# Patient Record
Sex: MALE | Race: ASIAN | NOT HISPANIC OR LATINO
[De-identification: names, ages, dates, MRNs, and addresses within clinical notes are randomized per-mention and may not be internally consistent; named-entity substitution may affect disease eponyms.]

---

## 2019-07-18 ENCOUNTER — APPOINTMENT (OUTPATIENT)
Dept: ORTHOPEDIC SURGERY | Facility: CLINIC | Age: 29
End: 2019-07-18
Payer: COMMERCIAL

## 2019-07-18 DIAGNOSIS — M94.269 CHONDROMALACIA, UNSPECIFIED KNEE: ICD-10-CM

## 2019-07-18 PROBLEM — Z00.00 ENCOUNTER FOR PREVENTIVE HEALTH EXAMINATION: Status: ACTIVE | Noted: 2019-07-18

## 2019-07-18 PROCEDURE — 99204 OFFICE O/P NEW MOD 45 MIN: CPT

## 2019-07-22 PROBLEM — M94.269 CHONDROMALACIA, KNEE: Status: ACTIVE | Noted: 2019-07-22

## 2019-07-22 NOTE — HISTORY OF PRESENT ILLNESS
[de-identified] : Patient reports fall 2 years began pain in the knee. Knee was initially swollen, but has gone down. Patient has tried PT as well as taken medication. Patient reports that he previously has surgery on the knee (for chondromalacia?). Patient has MRI of knee that was recently done while he was in [place], and received PRP injection (about one month ago).

## 2019-07-22 NOTE — ASSESSMENT
[FreeTextEntry1] : Discussed at length with patient his prior procedures as well as exam and imaging. Patient given home exercises and recommended for physical therapy and diagnosis of chondromalacia patella patellofemoral pain syndrome. Followup in 6-8 weeks if no improvement

## 2019-07-22 NOTE — PHYSICAL EXAM
[de-identified] : Right knee\par \par Constitutional: \par The patient is healthy-appearing and in no apparent distress. \par \par Gait:\par The patient ambulates with a normal gait and no limp.\par \par Cardiovascular System: \par There is capillary refill less than 2 seconds. \par \par \par Skin: \par There is no skin abnormalities.\par \par Right Knee: \par Bony Palpation: \par There is no tenderness of the medial or lateral joint line, the medial of lateral femoral chondyle, tibial tubercle, superior or inferior patella.\par There is tenderness to the medial and lateral patellar facets.\par \par Soft Tissue Palpation: \par There is no tenderness of the medial and lateral retinaculum, quadriceps tendon, patella tendon, ITB or pes anserine.\par \par Active Range of Motion: \par There is full range of motion at the knee actively and passively. \par \par Special Tests: \par There is a negative Apley and Steinmanns.  There is a negative Lachman, Anterior Drawer, and Posterior Drawer.  There is no varus or valgus laxity.\par \par Strength: \par There is 4/5 hip flexion and 5/5 knee flexion and extension.  \par \par Psychiatric: \par The patient demonstrates a normal mood and affect and is active and alert\par Alignment:\par There is external tibial rotation and femoral anteversion. [de-identified] : MRI provided by the patient from [place]. There is no significant patellar abnormalities other than mild chondromalacia. There is no significant patella all the patella baja. There is no ACL PCL abnormalities there is no meniscal pathology.\par \par